# Patient Record
Sex: FEMALE | Race: WHITE
[De-identification: names, ages, dates, MRNs, and addresses within clinical notes are randomized per-mention and may not be internally consistent; named-entity substitution may affect disease eponyms.]

---

## 2020-10-09 ENCOUNTER — HOSPITAL ENCOUNTER (EMERGENCY)
Dept: HOSPITAL 97 - ER | Age: 70
Discharge: HOME | End: 2020-10-09
Payer: COMMERCIAL

## 2020-10-09 VITALS — OXYGEN SATURATION: 100 % | SYSTOLIC BLOOD PRESSURE: 138 MMHG | DIASTOLIC BLOOD PRESSURE: 76 MMHG

## 2020-10-09 VITALS — TEMPERATURE: 97.8 F

## 2020-10-09 DIAGNOSIS — N39.0: ICD-10-CM

## 2020-10-09 DIAGNOSIS — E86.0: ICD-10-CM

## 2020-10-09 DIAGNOSIS — Z88.3: ICD-10-CM

## 2020-10-09 DIAGNOSIS — I10: ICD-10-CM

## 2020-10-09 DIAGNOSIS — Z88.5: ICD-10-CM

## 2020-10-09 DIAGNOSIS — E11.65: Primary | ICD-10-CM

## 2020-10-09 LAB
BUN BLD-MCNC: 46 MG/DL (ref 7–18)
GLUCOSE SERPLBLD-MCNC: 265 MG/DL (ref 74–106)
HCT VFR BLD CALC: 42.7 % (ref 36–45)
LYMPHOCYTES # SPEC AUTO: 1.5 K/UL (ref 0.7–4.9)
PMV BLD: 9.9 FL (ref 7.6–11.3)
POTASSIUM SERPL-SCNC: 3.9 MMOL/L (ref 3.5–5.1)
RBC # BLD: 4.65 M/UL (ref 3.86–4.86)
UA COMPLETE W REFLEX CULTURE PNL UR: (no result)

## 2020-10-09 PROCEDURE — 81015 MICROSCOPIC EXAM OF URINE: CPT

## 2020-10-09 PROCEDURE — 96372 THER/PROPH/DIAG INJ SC/IM: CPT

## 2020-10-09 PROCEDURE — 96361 HYDRATE IV INFUSION ADD-ON: CPT

## 2020-10-09 PROCEDURE — 87077 CULTURE AEROBIC IDENTIFY: CPT

## 2020-10-09 PROCEDURE — 85025 COMPLETE CBC W/AUTO DIFF WBC: CPT

## 2020-10-09 PROCEDURE — 82947 ASSAY GLUCOSE BLOOD QUANT: CPT

## 2020-10-09 PROCEDURE — 87186 SC STD MICRODIL/AGAR DIL: CPT

## 2020-10-09 PROCEDURE — 87086 URINE CULTURE/COLONY COUNT: CPT

## 2020-10-09 PROCEDURE — 99284 EMERGENCY DEPT VISIT MOD MDM: CPT

## 2020-10-09 PROCEDURE — 87088 URINE BACTERIA CULTURE: CPT

## 2020-10-09 PROCEDURE — 80048 BASIC METABOLIC PNL TOTAL CA: CPT

## 2020-10-09 PROCEDURE — 96374 THER/PROPH/DIAG INJ IV PUSH: CPT

## 2020-10-09 PROCEDURE — 36415 COLL VENOUS BLD VENIPUNCTURE: CPT

## 2020-10-09 PROCEDURE — 81003 URINALYSIS AUTO W/O SCOPE: CPT

## 2020-10-09 NOTE — ER
Nurse's Notes                                                                                     

 [facility]                                                                 

Name: Elizabeth Strong                                                                               

Age: 70 yrs                                                                                       

Sex: Female                                                                                       

: 1950                                                                                   

MRN: [id number]                                                                                   

Arrival Date: 10/09/2020                                                                          

Time: 14:09                                                                                       

Account#: [id number]                                                                            

Bed 7                                                                                             

Private MD:                                                                                       

Diagnosis: Hyperglycemia, unspecified;Dehydration;Urinary tract infection, site not specified     

                                                                                                  

Presentation:                                                                                     

10/09                                                                                             

14:18 Chief complaint: Patient states: Blood sugar reads hi today. + nausea. Not taking       ll1 

      medications as prescribed. Coronavirus screen: Client denies travel out of the [place] in      

      the last 14 days. At this time, the client does not indicate any symptoms associated        

      with coronavirus-19. Ebola Screen: Patient denies travel to an Ebola-affected area in       

      the 21 days before illness onset. Initial Sepsis Screen: Does the patient meet any 2        

      criteria? No. Patient's initial sepsis screen is negative. Does the patient have a          

      suspected source of infection? No. Patient's initial sepsis screen is negative. Risk        

      Assessment: Do you want to hurt yourself or someone else? Patient reports no desire to      

      harm self or others. Onset of symptoms was 2020.                                

14:18 Method Of Arrival: Ambulatory                                                           ll1 

14:18 Acuity: ANNE 3                                                                           ll1 

                                                                                                  

Historical:                                                                                       

- Allergies:                                                                                      

14:21 Codeine;                                                                                ll1 

14:21 Erythromycin;                                                                           ll1 

- PMHx:                                                                                           

14:21 Hypertension; Diabetes - IDDM;                                                          ll1 

- PSHx:                                                                                           

14:21 Hysterectomy;                                                                           ll1 

                                                                                                  

- Immunization history:: Flu vaccine is not up to date.                                           

- Social history:: Smoking status: Patient denies any tobacco usage or history of.                

                                                                                                  

                                                                                                  

Screenin:25 Abuse screen: Denies threats or abuse. Nutritional screening: No deficits noted.        em  

      Tuberculosis screening: No symptoms or risk factors identified. Fall Risk None              

      identified.                                                                                 

                                                                                                  

Assessment:                                                                                       

14:25 General: Appears in no apparent distress. obese, well groomed, Behavior is calm,        tw2 

      cooperative, appropriate for age. Pain: Denies pain. Neuro: Level of Consciousness is       

      awake, alert, obeys commands, Oriented to person, place, time, situation.                   

      Cardiovascular: Heart tones S1 S2 Patient's skin is warm and dry. Respiratory: Airway       

      is patent Respiratory effort is even, unlabored, Respiratory pattern is regular,            

      symmetrical, Breath sounds are clear bilaterally. GI: Abdomen is round non-distended,       

      obese, Bowel sounds present X 4 quads. Reports nausea. : No signs and/or symptoms         

      were reported regarding the genitourinary system. EENT: No signs and/or symptoms were       

      reported regarding the EENT system. Derm: No signs and/or symptoms reported regarding       

      the dermatologic system. Musculoskeletal: Range of motion: intact in all extremities.       

15:21 Reassessment: Patient appears in no apparent distress at this time. No changes from     tw2 

      previously documented assessment. Patient and/or family updated on plan of care and         

      expected duration. Pain level reassessed. Patient is alert, oriented x 3, equal             

      unlabored respirations, skin warm/dry/pink.                                                 

16:15 Reassessment: Patient appears in no apparent distress at this time. No changes from     tw2 

      previously documented assessment. Patient and/or family updated on plan of care and         

      expected duration. Pain level reassessed. Patient is alert, oriented x 3, equal             

      unlabored respirations, skin warm/dry/pink.                                                 

17:04 Reassessment: Patient appears in no apparent distress at this time. No changes from     tw2 

      previously documented assessment. Patient and/or family updated on plan of care and         

      expected duration. Pain level reassessed. Patient is alert, oriented x 3, equal             

      unlabored respirations, skin warm/dry/pink.                                                 

                                                                                                  

Vital Signs:                                                                                      

14:18  / 106; Pulse 79; Resp 18; Temp 97.8; Pulse Ox 100% ; Weight 113.4 kg; Height 5   ll1 

      ft. (152.40 cm); Pain 0/10;                                                                 

15:20  / 81; Pulse 80; Resp 17; Pulse Ox 100% on R/A;                                   tw2 

16:15  / 76; Pulse 69; Resp 17; Pulse Ox 99% on R/A;                                    tw2 

17:04  / 76; Pulse 67; Resp 17; Pulse Ox 100% on R/A;                                   tw2 

14:18 Body Mass Index 48.82 (113.40 kg, 152.40 cm)                                            ll1 

                                                                                                  

ED Course:                                                                                        

14:09 Patient arrived in ED.                                                                  ds1 

14:20 Triage completed.                                                                       ll1 

14:22 Arm band placed on Patient placed in an exam room, on a stretcher.                      ll1 

14:23 Omer Kaplan, RN is Primary Nurse.                                                      em  

14:25 Patient has correct armband on for positive identification. Bed in low position. Call   em  

      light in reach. Pulse ox on. NIBP on.                                                       

14:39 Chris Mckoy PA is PHCP.                                                              [facility] 

14:39 Dread Rodriguez MD is Attending Physician.                                             [facility] 

15:15 Inserted saline lock: 20 gauge in left antecubital area, using aseptic technique. Blood tw2 

      collected.                                                                                  

17:03 No provider procedures requiring assistance completed. IV discontinued, intact,         tw2 

      bleeding controlled, No redness/swelling at site. Pressure dressing applied.                

                                                                                                  

Administered Medications:                                                                         

15:20 Drug: NS 0.9% 500 ml Route: IV; Rate: bolus; Site: left antecubital;                    tw2 

16:14 Follow up: Response: No adverse reaction; IV Status: Completed infusion; IV Intake:     tw2 

      500ml                                                                                       

15:39 Drug: Insulin Regular Human 5 units {Co-Signature: jaems (Omer Kaplan RN).} Route: IVP;    tw2 

      Site: left antecubital;                                                                     

16:14 Follow up: Response: No adverse reaction; Blood sugar is lowered                        tw2 

15:39 Drug: Insulin Regular Human 5 units {Co-Signature: james (Omer Kaplan RN).} Route: Sub-Q;  tw2 

      Site: left upper arm;                                                                       

16:14 Follow up: Response: No adverse reaction; Blood sugar is lowered                        tw2 

                                                                                                  

                                                                                                  

Point of Care Testing:                                                                            

      Blood Glucose:                                                                              

14:27 Blood Glucose: 323 mg/dL;                                                               tw2 

16:14 Blood Glucose: 215 mg/dL;                                                               tw2 

14:27 by Cristhian Garrison                                                                          tw2 

      Ranges:                                                                                     

                                                                                                  

Intake:                                                                                           

16:14 IV: 500ml; Total: 500ml.                                                                tw2 

                                                                                                  

Outcome:                                                                                          

16:48 Discharge ordered by MD.                                                                [facility] 

17:03 Discharged to home ambulatory.                                                          tw2 

17:03 Condition: stable                                                                           

17:03 Discharge instructions given to patient, Instructed on discharge instructions, follow       

      up and referral plans. medication usage, Demonstrated understanding of instructions,        

      follow-up care, medications, Prescriptions given X 1.                                       

17:04 Patient left the ED.                                                                    tw2 

                                                                                                  

Addendum:                                                                                         

10/12/2020                                                                                        

     11:56 Addendum: Culture Results: Positive urine culture. Prescription called-in to pharmacy   a
a5

           of choice. to [facility] pharmacy in [place], [place], called-in Levaquin 500mg Daily x 5 

           days per WILMA Shin, spoke to pt's son over the phone and instructed to stop    

           Cephalexin.                                                                            

                                                                                                  

Signatures:                                                                                       

Chris Mckoy PA PA jmm Munoz, Edgar, RN                        RN   em                                                   

Downey, Zara                                ds1                                                  

Zenia Mcnamara, RN                     RN   aa5                                                  

Mala Byers RN                          RN   tw2                                                  

Rishi House RN                       RN   ll1                                                  

Omer Kaplan RN                               em                                                   

                                                                                                  

**************************************************************************************************

## 2020-10-09 NOTE — EDPHYS
Physician Documentation                                                                           

 Formerly [facility]                                                                 

Name: Elizabeth Strong                                                                               

Age: 70 yrs                                                                                       

Sex: Female                                                                                       

: 1950                                                                                   

MRN: [id number]                                                                                   

Arrival Date: 10/09/2020                                                                          

Time: 14:09                                                                                       

Account#: [id number]                                                                            

Bed 7                                                                                             

Private MD:                                                                                       

ED Physician Dread Rodriguez                                                                      

HPI:                                                                                              

10/09                                                                                             

14:41 This 70 yrs old  Female presents to ER via Ambulatory with complaints of High  jmm 

      Blood Pressure.                                                                             

14:41 This is a 70 year old female with a history of HTN, DM, that presents to the ED due to  m 

      concerns from home nursing. Patient BGL was elevated along with blood pressure. Patient     

      complains of fatigue but denies chest pain, headache, shortness of breath, abdominal        

      pain or dysuria. . Onset: The symptoms/episode began/occurred today.                        

                                                                                                  

Historical:                                                                                       

- Allergies:                                                                                      

14:21 Codeine;                                                                                ll1 

14:21 Erythromycin;                                                                           ll1 

- PMHx:                                                                                           

14:21 Hypertension; Diabetes - IDDM;                                                          ll1 

- PSHx:                                                                                           

14:21 Hysterectomy;                                                                           ll1 

                                                                                                  

- Immunization history:: Flu vaccine is not up to date.                                           

- Social history:: Smoking status: Patient denies any tobacco usage or history of.                

                                                                                                  

                                                                                                  

ROS:                                                                                              

14:41 Cardiovascular: Negative for chest pain, palpitations, and edema, Respiratory: Negative jmm 

      for shortness of breath, cough, wheezing, and pleuritic chest pain, Abdomen/GI:             

      Negative for abdominal pain, nausea, vomiting, diarrhea, and constipation.                  

14:41 Constitutional: Positive for fatigue, malaise.                                              

14:41 Neuro: Positive for weakness.                                                               

14:41 All other systems are negative.                                                             

                                                                                                  

Exam:                                                                                             

14:41 Constitutional:  This is a well developed, well nourished patient who is awake, alert,  jmm 

      and in no acute distress. Head/Face:  atraumatic. Eyes:  EOMI, no conjunctival erythema     

      appreciated ENT:  Moist Mucus Membranes Neck:  Trachea midline, Supple Chest/axilla:        

      Normal chest wall appearance and motion.   Cardiovascular:  Regular rate and rhythm.        

      No edema appreciated Respiratory:  Normal respirations, no respiratory distress             

      appreciated Abdomen/GI:  Non distended, soft Back:  Normal ROM Skin:  General               

      appearance color normal MS/ Extremity:  Moves all extremities, no obvious deformities       

      appreciated, no edema noted to the lower extremities  Neuro:  Awake and alert, normal       

      gait Psych:  Behavior is normal, Mood is normal, Patient is cooperative and pleasant        

                                                                                                  

Vital Signs:                                                                                      

14:18  / 106; Pulse 79; Resp 18; Temp 97.8; Pulse Ox 100% ; Weight 113.4 kg; Height 5   ll1 

      ft. (152.40 cm); Pain 0/10;                                                                 

15:20  / 81; Pulse 80; Resp 17; Pulse Ox 100% on R/A;                                   tw2 

16:15  / 76; Pulse 69; Resp 17; Pulse Ox 99% on R/A;                                    tw2 

17:04  / 76; Pulse 67; Resp 17; Pulse Ox 100% on R/A;                                   tw2 

14:18 Body Mass Index 48.82 (113.40 kg, 152.40 cm)                                            ll1 

                                                                                                  

MDM:                                                                                              

14:41 Patient medically screened.                                                             claudio 

16:46 Data reviewed: vital signs, nurses notes. Counseling: I had a detailed discussion with  maribell 

      the patient and/or guardian regarding: the historical points, exam findings, and any        

      diagnostic results supporting the discharge/admit diagnosis, lab results, the need for      

      outpatient follow up, to return to the emergency department if symptoms worsen or           

      persist or if there are any questions or concerns that arise at home. ED course:            

      Patient is alert and non toxic in appearance in the ED. Patient is advised to follow up     

      with pcp and otherwise given strict return precautions. Patient understood and agrees       

      with the plan of care. .                                                                    

                                                                                                  

10/09                                                                                             

14:39 Order name: Glucose, Ancillary Testing; Complete Time: 14:41                            [facility]

10/09                                                                                             

14:55 Order name: CBC with Diff; Complete Time: 15:44                                         [facility] 

10/09                                                                                             

14:55 Order name: BMP; Complete Time: 16:01                                                   [facility] 

10/09                                                                                             

15:24 Order name: Urine Microscopic Only; Complete Time: 16:51                                [facility] 

10/09                                                                                             

15:31 Order name: Urine Dipstick--Ancillary (enter results); Complete Time: 16:23               

10/09                                                                                             

16:25 Order name: Glucose, Ancillary Testing; Complete Time: 16:26                            [facility]

10/09                                                                                             

14:55 Order name: Urine Dipstick-Ancillary (obtain specimen); Complete Time: 15:24            [facility] 

10/09                                                                                             

15:20 Order name: IV Start; Complete Time: 15:20                                              tw2 

10/09                                                                                             

16:52 Order name: Urine Culture                                                               [facility]

                                                                                                  

Administered Medications:                                                                         

15:20 Drug: NS 0.9% 500 ml Route: IV; Rate: bolus; Site: left antecubital;                    tw2 

16:14 Follow up: Response: No adverse reaction; IV Status: Completed infusion; IV Intake:     tw2 

      500ml                                                                                       

15:39 Drug: Insulin Regular Human 5 units {Co-Signature: james (Omer Kaplan RN).} Route: IVP;    tw2 

      Site: left antecubital;                                                                     

16:14 Follow up: Response: No adverse reaction; Blood sugar is lowered                        tw2 

15:39 Drug: Insulin Regular Human 5 units {Co-Signature: james (Omer Kaplan RN).} Route: Sub-Q;  tw2 

      Site: left upper arm;                                                                       

16:14 Follow up: Response: No adverse reaction; Blood sugar is lowered                        tw2 

                                                                                                  

                                                                                                  

Point of Care Testing:                                                                            

      Blood Glucose:                                                                              

14:27 Blood Glucose: 323 mg/dL;                                                               tw2 

16:14 Blood Glucose: 215 mg/dL;                                                               tw2 

14:27 by XplentyCristhian                                                                          tw2 

      Ranges:                                                                                     

      Critical Glucose Levels:Adult <50 mg/dl or >400 mg/dl  <40 mg/dl or >180 mg/dl       

Disposition:                                                                                      

10/09/20 16:48 Discharged to Home. Impression: Hyperglycemia, unspecified, Dehydration,           

  Urinary tract infection, site not specified.                                                    

- Condition is Stable.                                                                            

- Discharge Instructions: Dehydration, Elderly, Hyperglycemia, Urinary Tract Infection,           

  Adult.                                                                                          

- Prescriptions for Cephalexin 500 mg Oral Capsule - take 1 capsule by ORAL route every           

  8 hours for 10 days; 30 capsule.                                                                

- Medication Reconciliation Form, Thank You Letter, Antibiotic Education, Prescription            

  Opioid Use form.                                                                                

- Follow up: Private Physician; When: 2 - 3 days; Reason: Recheck today's complaints,             

  Continuance of care, Re-evaluation by your physician.                                           

                                                                                                  

                                                                                                  

                                                                                                  

Addendum:                                                                                         

10/10/2020                                                                                        

     17:56 Co-signature as Attending Physician, Dread Rodriguez MD I agree with the assessment and  c
ha

           plan of care.                                                                          

                                                                                                  

Signatures:                                                                                       

Dispatcher [employer]                           Dread Bowen MD MD cha Mickail, Joel, PA PA jmm Wise, Tara, RN                          RN   tw2                                                  

Rishi House RN                       RN   1                                                  

Omer Kaplan RN                               em                                                   

                                                                                                  

Corrections: (The following items were deleted from the chart)                                    

10/09                                                                                             

17:04 16:48 10/09/2020 16:48 Discharged to Home. Impression: Hyperglycemia, unspecified;      tw2 

      Dehydration; Urinary tract infection, site not specified. Condition is Stable. Forms        

      are Medication Reconciliation Form, Thank You Letter, Antibiotic Education,                 

      Prescription Opioid Use. Follow up: Private Physician; When: 2 - 3 days; Reason:            

      Recheck today's complaints, Continuance of care, Re-evaluation by your physician. maribell       

                                                                                                  

**************************************************************************************************

## 2021-04-11 ENCOUNTER — HOSPITAL ENCOUNTER (EMERGENCY)
Dept: HOSPITAL 97 - ER | Age: 71
Discharge: HOME | End: 2021-04-11
Payer: COMMERCIAL

## 2021-04-11 VITALS — SYSTOLIC BLOOD PRESSURE: 141 MMHG | DIASTOLIC BLOOD PRESSURE: 96 MMHG

## 2021-04-11 VITALS — TEMPERATURE: 98 F

## 2021-04-11 VITALS — OXYGEN SATURATION: 96 %

## 2021-04-11 DIAGNOSIS — F03.90: ICD-10-CM

## 2021-04-11 DIAGNOSIS — Z88.5: ICD-10-CM

## 2021-04-11 DIAGNOSIS — Z88.3: ICD-10-CM

## 2021-04-11 DIAGNOSIS — E11.649: Primary | ICD-10-CM

## 2021-04-11 DIAGNOSIS — I10: ICD-10-CM

## 2021-04-11 LAB
ALBUMIN SERPL BCP-MCNC: 2.6 G/DL (ref 3.4–5)
ALP SERPL-CCNC: 93 U/L (ref 45–117)
ALT SERPL W P-5'-P-CCNC: 13 U/L (ref 12–78)
AST SERPL W P-5'-P-CCNC: 13 U/L (ref 15–37)
BLD SMEAR INTERP: (no result)
BUN BLD-MCNC: 29 MG/DL (ref 7–18)
GLUCOSE SERPLBLD-MCNC: 492 MG/DL (ref 74–106)
HCT VFR BLD CALC: 41.3 % (ref 36–45)
INR BLD: 0.95
LIPASE SERPL-CCNC: 83 U/L (ref 73–393)
LYMPHOCYTES # SPEC AUTO: 0.4 K/UL (ref 0.7–4.9)
MAGNESIUM SERPL-MCNC: 2.1 MG/DL (ref 1.8–2.4)
MORPHOLOGY BLD-IMP: (no result)
NT-PROBNP SERPL-MCNC: 2842 PG/ML (ref ?–125)
PMV BLD: 8.9 FL (ref 7.6–11.3)
POTASSIUM SERPL-SCNC: 4.5 MMOL/L (ref 3.5–5.1)
RBC # BLD: 4.47 M/UL (ref 3.86–4.86)
TROPONIN (EMERG DEPT USE ONLY): < 0.02 NG/ML (ref 0–0.04)

## 2021-04-11 PROCEDURE — 85610 PROTHROMBIN TIME: CPT

## 2021-04-11 PROCEDURE — 81003 URINALYSIS AUTO W/O SCOPE: CPT

## 2021-04-11 PROCEDURE — 70450 CT HEAD/BRAIN W/O DYE: CPT

## 2021-04-11 PROCEDURE — 82947 ASSAY GLUCOSE BLOOD QUANT: CPT

## 2021-04-11 PROCEDURE — 83690 ASSAY OF LIPASE: CPT

## 2021-04-11 PROCEDURE — 80048 BASIC METABOLIC PNL TOTAL CA: CPT

## 2021-04-11 PROCEDURE — 85025 COMPLETE CBC W/AUTO DIFF WBC: CPT

## 2021-04-11 PROCEDURE — 36415 COLL VENOUS BLD VENIPUNCTURE: CPT

## 2021-04-11 PROCEDURE — 84484 ASSAY OF TROPONIN QUANT: CPT

## 2021-04-11 PROCEDURE — 80076 HEPATIC FUNCTION PANEL: CPT

## 2021-04-11 PROCEDURE — 81015 MICROSCOPIC EXAM OF URINE: CPT

## 2021-04-11 PROCEDURE — 83735 ASSAY OF MAGNESIUM: CPT

## 2021-04-11 PROCEDURE — 99284 EMERGENCY DEPT VISIT MOD MDM: CPT

## 2021-04-11 PROCEDURE — 83880 ASSAY OF NATRIURETIC PEPTIDE: CPT

## 2021-04-11 PROCEDURE — 93005 ELECTROCARDIOGRAM TRACING: CPT

## 2021-04-11 NOTE — EDPHYS
Physician Documentation                                                                           

 [facility]                                                                 

Name: Elizabeth Strong                                                                               

Age: 71 yrs                                                                                       

Sex: Female                                                                                       

: 1950                                                                                   

MRN: [id number]                                                                                   

Arrival Date: 2021                                                                          

Time: 13:10                                                                                       

Account#: [id number]                                                                            

Bed 8                                                                                             

Private MD:                                                                                       

ED Physician Sudeep York                                                                         

HPI:                                                                                              

                                                                                             

13:25 This 71 yrs old  Female presents to ER via EMS with complaints of High Blood   cp  

      Sugar.                                                                                      

13:25 The patient or guardian reports hyperglycemia that was potentially precipitated by      cp  

      change of medications.                                                                      

13:25 Onset: The symptoms/episode began/occurred over past several days.                      cp  

13:25 Associated signs and symptoms: Pertinent negatives: diarrhea, vomiting, altered mental  cp  

      status, chest pain, abdominal pain. Current symptoms: In the emergency department the       

      patient's symptoms are unchanged from the initial presentation.                             

15:30 Daughter reports patient was recently taken off insulin and placed on oral medication   cp  

      for diabetes.                                                                               

                                                                                                  

Historical:                                                                                       

- Allergies:                                                                                      

13:15 Codeine;                                                                                hb  

13:15 Erythromycin;                                                                           hb  

- PMHx:                                                                                           

13:15 Diabetes - IDDM; Hypertension; Dementia;                                                hb  

- PSHx:                                                                                           

13:15 Hysterectomy;                                                                           hb  

                                                                                                  

- Immunization history:: Adult Immunizations up to date.                                          

- Social history:: Smoking status: Patient denies any tobacco usage or history of.                

                                                                                                  

                                                                                                  

ROS:                                                                                              

13:30 Eyes: Negative for injury, pain, redness, and discharge.                                cp  

13:30 Constitutional: Negative for fever.                                                         

13:30 Cardiovascular: Negative for chest pain.                                                    

13:30 Respiratory: Negative for cough, wheezing.                                                  

13:30 Abdomen/GI: Negative for abdominal pain, vomiting, diarrhea, constipation.                  

13:30 Neuro: Negative for altered mental status, headache, syncope.                               

13:30 All other systems are negative.                                                             

                                                                                                  

Exam:                                                                                             

13:35 Constitutional: The patient appears in no acute distress, alert, awake,                 cp  

      non-diaphoretic, non-toxic, well developed, well nourished.                                 

13:35 Head/Face:  Normocephalic, atraumatic.                                                  cp  

13:35 Eyes: Periorbital structures: appear normal, Pupils: equal, round, and reactive to          

      light and accomodation, Extraocular movements: intact throughout, Conjunctiva: normal,      

      no exudate, no injection, Sclera: no appreciated abnormality, Lids and lashes: appear       

      normal, bilaterally.                                                                        

13:35 ENT: External ear(s): are unremarkable, Nose: is normal, Mouth: Lips: dry, Oral mucosa:     

      moist, Posterior pharynx: Airway: no evidence of obstruction, patent.                       

13:35 Neck: ROM/movement: is normal, is supple, without pain, no range of motions                 

      limitations, no meningismus.                                                                

13:35 Chest/axilla: Inspection: normal, Palpation: is normal, no crepitus, no tenderness.         

13:35 Cardiovascular: Rate: normal, Rhythm: regular, Edema: ankle edema, that is very mild,       

      JVD: is not appreciated.                                                                    

13:35 Respiratory: the patient does not display signs of respiratory distress,  Respirations:     

      normal, no use of accessory muscles, no retractions, labored breathing, is not present,     

      Breath sounds: are clear throughout, no decreased breath sounds, no stridor, no             

      wheezing.                                                                                   

13:35 Abdomen/GI: Inspection: abdomen appears normal, Palpation: abdomen is soft and              

      non-tender, in all quadrants.                                                               

13:35 Skin: cellulitis, is not appreciated, no rash present.                                      

13:35 Neuro: Orientation: no acute changes, per EMS, to person, Mentation: no acute changes,      

      per EMS, able to follow commands.                                                           

14:20 ECG was reviewed by the Attending Physician.                                              

                                                                                                  

Vital Signs:                                                                                      

13:13  / 84; Pulse 70; Resp 16; Temp 98; Pulse Ox 97% on R/A;                           hb  

13:59  / 109; Pulse 88; Resp 15; Pulse Ox 98% on R/A;                                   hb  

15:00  / 91; Pulse 82; Resp 17; Pulse Ox 96% on R/A;                                    hb  

15:45  / 96; Pulse 71; Resp 17; Pulse Ox 96% on R/A;                                    hb  

                                                                                                  

MDM:                                                                                              

13:23 Patient medically screened.                                                             cp  

14:00 Differential diagnosis: DKA, UTI, acute MI, pancreatitis.                                 

15:50 Data reviewed: vital signs, nurses notes, lab test result(s), EKG, radiologic studies,  cp  

      CT scan, I have discussed the patient's presentation/case with the attending Emergency      

      Department Physician; and as a result, I will discharge patient.                            

15:50 Test interpretation: by ED physician or midlevel provider: ECG.                           

15:50 Counseling: I had a detailed discussion with the patient and/or guardian regarding: the cp  

      historical points, exam findings, and any diagnostic results supporting the                 

      discharge/admit diagnosis, lab results, radiology results, the need for outpatient          

      follow up, for definitive care, an internist, to return to the emergency department if      

      symptoms worsen or persist or if there are any questions or concerns that arise at          

      home. Response to treatment: the patient's symptoms have markedly improved after            

      treatment, and as a result, I will discharge patient.                                       

                                                                                                  

                                                                                             

13:16 Order name: Basic Metabolic Panel                                                         

                                                                                             

13:16 Order name: CBC with Diff; Complete Time: 14:58                                         cp  

                                                                                             

14:21 Interpretation: Normal except: WBC 13.00; ALFONZO% 91.9; LYM% 3.3; NEUT A 11.9; LYMA 0.4.     

                                                                                             

13:16 Order name: LFT's; Complete Time: 14:15                                                   

                                                                                             

14:15 Interpretation: Normal except: AST 13; ALB 2.6; GLOB 4.2; A/G 0.6.                        

                                                                                             

13:16 Order name: Magnesium; Complete Time: 14:15                                               

                                                                                             

14:59 Interpretation: MG 2.1; Reviewed.                                                       cp  

                                                                                             

13:16 Order name: NT PRO-BNP; Complete Time: 14:15                                              

                                                                                             

13:16 Order name: PT-INR; Complete Time: 13:58                                                  

                                                                                             

13:16 Order name: Troponin (emerg Dept Use Only); Complete Time: 14:15                          

                                                                                             

14:24 Interpretation: Abnormal: TROPED < 0.02.                                                  

                                                                                             

13:16 Order name: Urine Microscopic Only; Complete Time: 13:58                                  

                                                                                             

13:58 Interpretation: Normal except: URBC 5-10; AMORPH 3+.                                    cp  

                                                                                             

13:17 Order name: Basic Metabolic Panel; Complete Time: 14:15                                 EDMS

                                                                                             

14:21 Interpretation: Normal except: ; GLUC 492; BUN 29; CRE 2.22; GFR 22.              cp  

                                                                                             

13:18 Order name: Lipase; Complete Time: 14:15                                                cp  

                                                                                             

13:29 Order name: Urine Dipstick-Ancillary; Complete Time: 13:57                              EDMS

                                                                                             

13:57 Interpretation: Normal except: UGLUC 2+; UBLD 1+; UPROT 3+.                             cp  

                                                                                             

13:54 Order name: CBC Smear Scan; Complete Time: 14:58                                        EDMS

                                                                                             

14:23 Order name: CT Head Brain wo Cont; Complete Time: 15:05                                   

                                                                                             

15:06 Interpretation: Report reviewed.                                                          

                                                                                             

15:52 Order name: Glucose, Ancillary Testing                                                  EDMS

                                                                                             

13:16 Order name: EKG; Complete Time: 13:17                                                     

                                                                                             

13:16 Order name: Cardiac monitoring; Complete Time: 13:21                                      

                                                                                             

13:16 Order name: EKG - Nurse/Tech; Complete Time: 14:02                                        

                                                                                             

13:16 Order name: IV Saline Lock; Complete Time: 13:31                                          

                                                                                             

13:16 Order name: Labs collected and sent; Complete Time: 13:31                                 

                                                                                             

13:16 Order name: O2 Per Protocol; Complete Time: 13:21                                         

                                                                                             

13:16 Order name: O2 Sat Monitoring; Complete Time: 13:21                                       

                                                                                             

13:16 Order name: Cath; Complete Time: 13:31                                                    

                                                                                             

13:16 Order name: Urine Dipstick-Ancillary (obtain specimen); Complete Time: 13:31              

                                                                                             

13:16 Order name: Accucheck Blood Glucose; Complete Time: 14:01                                 

                                                                                             

15:06 Order name: Accucheck Blood Glucose; Complete Time: 15:46                               cp  

                                                                                                  

EC:20 Rate is 79 beats/min. Rhythm is regular. QRS interval is normal. QT interval is normal. cp  

      T waves are Inverted in leads aVL, aVR. Interpreted by me. Reviewed by me.                  

                                                                                                  

Administered Medications:                                                                         

13:31 Drug: NS 0.9% 250 ml Route: IV; Rate: bolus; Site: right antecubital;                   hb  

14:20 Follow up: IV Status: Completed infusion; IV Intake: 500ml                              hb  

15:47 Follow up: Response: No adverse reaction                                                hb  

14:25 Drug: Insulin Regular Human 10 units {Co-Signature: ss (Beatrice Oseguera RN).} Route: IVP; hb  

      Site: left antecubital;                                                                     

15:46 Follow up: Response: No adverse reaction                                                hb  

14:30 Drug: NS 0.9% 250 ml Route: IV; Rate: bolus; Site: left antecubital;                    hb  

15:10 Follow up: IV Status: Completed infusion; IV Intake: 500ml                              hb  

15:15 Follow up: Response: No adverse reaction                                                hb  

                                                                                                  

                                                                                                  

Disposition:                                                                                      

17:31 Co-signature as Attending Physician, Sudeep York MD.                                    rn  

                                                                                                  

Disposition:                                                                                      

21 15:51 Discharged to Home. Impression: Diabetes mellitus due to underlying condition      

  with hyperglycemia.                                                                             

- Condition is Stable.                                                                            

- Discharge Instructions: Blood Glucose Monitoring, Adult, Diabetes Mellitus and Food.            

                                                                                                  

- Medication Reconciliation Form, Thank You Letter, Antibiotic Education, Prescription            

  Opioid Use form.                                                                                

- Follow up: Private Physician; When: 1 - 2 days; Reason: Recheck today's complaints.             

                                                                                                  

                                                                                                  

                                                                                                  

Signatures:                                                                                       

Dispatcher [employer]                           EDMS                                                 

Sudeep York MD MD   rn                                                   

Dread Vargas PA PA cp Baxter, Heather, RN                     RN   galen Oseguera RN                             ss                                                   

                                                                                                  

Corrections: (The following items were deleted from the chart)                                    

14:21 13:57 Normal except: WBC 13.00; ALFONZO% 91.9; LYM% 3.3; NEUT A 11.9. cp                    cp  

16:41 15:51 2021 15:51 Discharged to Home. Impression: Diabetes mellitus due to         hb  

      underlying condition with hyperglycemia. Condition is Stable. Forms are Medication          

      Reconciliation Form, Thank You Letter, Antibiotic Education, Prescription Opioid Use.       

      Follow up: Private Physician; When: 1 - 2 days; Reason: Recheck today's complaints. cp      

18:57 17:30 Constitutional: Negative for fever, cp                                            cp  

18:57 17:30 Cardiovascular: Negative for chest pain, cp                                       cp  

18:57 17:30 Abdomen/GI: Negative for abdominal pain, vomiting, diarrhea, constipation, cp     cp  

18:57 17:30 Skin: Negative for rash, cp                                                       cp  

18:57 17:30 Neuro: Negative for altered mental status, headache, cp                           cp  

18:57 17:30 All other systems are negative, cp                                                cp  

                                                                                             

14:46  15:20 Daughter reports patient was recently taken off insulin and placed on oral  cp  

      medication for diabetes. cp                                                                 

                                                                                                  

**************************************************************************************************

## 2021-04-11 NOTE — RAD REPORT
EXAM DESCRIPTION:  CT - Head Brain Wo Cont - 4/11/2021 2:45 pm

 

CLINICAL HISTORY:  hypertension

Headache, drowsiness, hypertension

 

COMPARISON:  No comparisons

 

TECHNIQUE:  All CT scans are performed using dose optimization technique as appropriate and may inclu
de automated exposure control or mA/KV adjustment according to patient size.

 

FINDINGS:  No intracranial hemorrhage, hydrocephalus or extra-axial fluid collection.Mild generalized
 brain atrophy is present with mild periventricular and deep white matter chronic microvascular ische
chana changes.No areas of brain edema or evidence of midline shift.

 

The paranasal sinuses and mastoids are clear. The calvarium is intact. Mild vertebral atherosclerosis
.

 

IMPRESSION:  No acute intracranial abnormality.

## 2021-04-11 NOTE — ER
Nurse's Notes                                                                                     

 [facility]                                                                 

Name: Elizabeth Strong                                                                               

Age: 71 yrs                                                                                       

Sex: Female                                                                                       

: 1950                                                                                   

MRN: [id number]                                                                                   

Arrival Date: 2021                                                                          

Time: 13:10                                                                                       

Account#: [id number]                                                                            

Bed 8                                                                                             

Private MD:                                                                                       

Diagnosis: Diabetes mellitus due to underlying condition with hyperglycemia                       

                                                                                                  

Presentation:                                                                                     

                                                                                             

13:13 Chief complaint: EMS states: Home -500s for last several days, family reports    hb  

      recent insulin change. Coronavirus screen: At this time, the client does not indicate       

      any symptoms associated with coronavirus-19. Ebola Screen: No symptoms or risks             

      identified at this time. Initial Sepsis Screen: Does the patient meet any 2 criteria?       

      No. Patient's initial sepsis screen is negative. Does the patient have a suspected          

      source of infection? No. Patient's initial sepsis screen is negative. Risk Assessment:      

      Do you want to hurt yourself or someone else? Patient reports no desire to harm self or     

      others. Onset of symptoms was 2021.                                               

13:13 Method Of Arrival: EMS: [facility]                                                       hb  

13:13 Acuity: ANNE 3                                                                           hb  

13:15 Care prior to arrival: Glucose check: 460.                                              hb  

                                                                                                  

Historical:                                                                                       

- Allergies:                                                                                      

13:15 Codeine;                                                                                hb  

13:15 Erythromycin;                                                                           hb  

- PMHx:                                                                                           

13:15 Diabetes - IDDM; Hypertension; Dementia;                                                hb  

- PSHx:                                                                                           

13:15 Hysterectomy;                                                                           hb  

                                                                                                  

- Immunization history:: Adult Immunizations up to date.                                          

- Social history:: Smoking status: Patient denies any tobacco usage or history of.                

                                                                                                  

                                                                                                  

Screenin:15 Abuse screen: Denies threats or abuse. Denies injuries from another. Nutritional        hb  

      screening: No deficits noted. Tuberculosis screening: No symptoms or risk factors           

      identified. Fall Risk None identified.                                                      

                                                                                                  

Assessment:                                                                                       

13:15 General: Appears in no apparent distress. Behavior is calm, cooperative. Pain: Denies   hb  

      pain. Neuro: Level of Consciousness is awake, alert, obeys commands, Oriented to            

      person, place, situation. Cardiovascular: Patient's skin is warm and dry. Rhythm is         

      regular. Respiratory: Respiratory effort is even, unlabored, Respiratory pattern is         

      regular, symmetrical. GI: No signs and/or symptoms were reported involving the              

      gastrointestinal system. : No signs and/or symptoms were reported regarding the           

      genitourinary system. EENT: No signs and/or symptoms were reported regarding the EENT       

      system. Derm: Skin is pink, warm \T\ dry. Musculoskeletal: No signs and/or symptoms         

      reported regarding the musculoskeletal system.                                              

14:00 Reassessment: Patient appears in no apparent distress at this time. No changes from     hb  

      previously documented assessment. Patient and/or family updated on plan of care and         

      expected duration. Pain level reassessed.                                                   

14:36 Reassessment: cleaned patient of urinary incontinence. Linens change. Call light within ss  

      reach.                                                                                      

15:46 Reassessment: Patient appears in no apparent distress at this time. No changes from     hb  

      previously documented assessment. Patient and/or family updated on plan of care and         

      expected duration. Pain level reassessed.                                                   

16:30 Reassessment: Patient appears in no apparent distress at this time. No changes from     hb  

      previously documented assessment. Patient and/or family updated on plan of care and         

      expected duration. Pain level reassessed.                                                   

                                                                                                  

Vital Signs:                                                                                      

13:13  / 84; Pulse 70; Resp 16; Temp 98; Pulse Ox 97% on R/A;                           hb  

13:59  / 109; Pulse 88; Resp 15; Pulse Ox 98% on R/A;                                   hb  

15:00  / 91; Pulse 82; Resp 17; Pulse Ox 96% on R/A;                                    hb  

15:45  / 96; Pulse 71; Resp 17; Pulse Ox 96% on R/A;                                    hb  

                                                                                                  

ED Course:                                                                                        

13:10 Patient arrived in ED.                                                                  ds1 

13:13 Анна Danielle, RN is Primary Nurse.                                                   hb  

13:14 Triage completed.                                                                       hb  

13:15 Dread Vargas PA is PHCP.                                                                cp  

13:15 Sudeep York MD is Attending Physician.                                                cp  

13:15 Arm band placed on.                                                                     hb  

13:15 Patient has correct armband on for positive identification. Bed in low position. Call   hb  

      light in reach.                                                                             

13:50 Missed attempt(s): 22 gauge in right antecubital area. Bleeding controlled, band aid    hb  

      applied, catheter tip intact.                                                               

13:53 Inserted saline lock: 22 gauge in left antecubital area, using aseptic technique. Blood hb  

      collected.                                                                                  

14:02 Basic Metabolic Panel Sent.                                                             hb  

14:45 CT Head Brain wo Cont In Process Unspecified.                                           EDMS

16:41 No provider procedures requiring assistance completed. IV discontinued, intact,         hb  

      bleeding controlled, No redness/swelling at site.                                           

                                                                                                  

Administered Medications:                                                                         

13:31 Drug: NS 0.9% 250 ml Route: IV; Rate: bolus; Site: right antecubital;                   hb  

14:20 Follow up: IV Status: Completed infusion; IV Intake: 500ml                              hb  

15:47 Follow up: Response: No adverse reaction                                                hb  

14:25 Drug: Insulin Regular Human 10 units {Co-Signature: ss (Beatrice Oseguera RN).} Route: IVP; hb  

      Site: left antecubital;                                                                     

15:46 Follow up: Response: No adverse reaction                                                hb  

14:30 Drug: NS 0.9% 250 ml Route: IV; Rate: bolus; Site: left antecubital;                    hb  

15:10 Follow up: IV Status: Completed infusion; IV Intake: 500ml                              hb  

15:15 Follow up: Response: No adverse reaction                                                hb  

                                                                                                  

                                                                                                  

Intake:                                                                                           

14:20 IV: 500ml; Total: 500ml.                                                                hb  

15:10 IV: 500ml; Total: 1000ml.                                                               hb  

                                                                                                  

Outcome:                                                                                          

15:51 Discharge ordered by MD.                                                                cp  

16:41 Discharged to home via wheelchair, with family.                                         hb  

16:41 Condition: stable                                                                           

16:41 Discharge instructions given to patient, family, Instructed on discharge instructions,      

      follow up and referral plans. medication usage, Demonstrated understanding of               

      instructions, follow-up care, medications.                                                  

16:41 Patient left the ED.                                                                    hb  

                                                                                                  

Signatures:                                                                                       

Dispatcher [employer]                           EDMS                                                 

DowneyZara churchill                                ds1                                                  

Beatrice Oseguera RN RN   ss                                                   

Dread Vargas PA PA cp Baxter, Heather, RN RN                                                      

Beatrice martinez                                                   

                                                                                                  

**************************************************************************************************

## 2021-04-12 NOTE — EKG
Test Date:    2021-04-11               Test Time:    14:09:17

Technician:   JOSE                                     

                                                     

MEASUREMENT RESULTS:                                       

Intervals:                                           

Rate:         79                                     

SD:           296                                    

QRSD:         76                                     

QT:           406                                    

QTc:          465                                    

Axis:                                                

P:                                                   

SD:           296                                    

QRS:          -41                                    

T:            70                                     

                                                     

INTERPRETIVE STATEMENTS:                                       

                                                     

Atrial-paced rhythm with prolonged AV conduction

Left axis deviation

Minimal voltage criteria for LVH, may be normal variant

Anterior infarct, age undetermined

Abnormal ECG

No previous ECG available for comparison



Electronically Signed On 04-12-21 07:14:04 CDT by Carlos Enrique Hernandez

## 2021-10-11 ENCOUNTER — HOSPITAL ENCOUNTER (EMERGENCY)
Dept: HOSPITAL 97 - ER | Age: 71
Discharge: HOME | End: 2021-10-11
Payer: COMMERCIAL

## 2021-10-11 VITALS — SYSTOLIC BLOOD PRESSURE: 182 MMHG | DIASTOLIC BLOOD PRESSURE: 98 MMHG

## 2021-10-11 VITALS — OXYGEN SATURATION: 98 %

## 2021-10-11 VITALS — TEMPERATURE: 98.3 F

## 2021-10-11 DIAGNOSIS — Z88.5: ICD-10-CM
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
## 2021-10-11 DIAGNOSIS — I10: ICD-10-CM

## 2021-10-11 DIAGNOSIS — E11.9: ICD-10-CM

## 2021-10-11 DIAGNOSIS — Z88.3: ICD-10-CM

## 2021-10-11 DIAGNOSIS — S32.049A: Primary | ICD-10-CM

## 2021-10-11 DIAGNOSIS — F03.90: ICD-10-CM

## 2021-10-11 LAB
ALBUMIN SERPL BCP-MCNC: 2.9 G/DL (ref 3.4–5)
ALP SERPL-CCNC: 76 U/L (ref 45–117)
ALT SERPL W P-5'-P-CCNC: 20 U/L (ref 12–78)
AST SERPL W P-5'-P-CCNC: 18 U/L (ref 15–37)
BUN BLD-MCNC: 36 MG/DL (ref 7–18)
GLUCOSE SERPLBLD-MCNC: 231 MG/DL (ref 74–106)
HCT VFR BLD CALC: 41.8 % (ref 36–45)
LYMPHOCYTES # SPEC AUTO: 1 K/UL (ref 0.7–4.9)
PMV BLD: 8.1 FL (ref 7.6–11.3)
POTASSIUM SERPL-SCNC: 4.4 MMOL/L (ref 3.5–5.1)
RBC # BLD: 4.56 M/UL (ref 3.86–4.86)

## 2021-10-11 PROCEDURE — 73552 X-RAY EXAM OF FEMUR 2/>: CPT

## 2021-10-11 PROCEDURE — 96374 THER/PROPH/DIAG INJ IV PUSH: CPT

## 2021-10-11 PROCEDURE — 99285 EMERGENCY DEPT VISIT HI MDM: CPT

## 2021-10-11 PROCEDURE — 96375 TX/PRO/DX INJ NEW DRUG ADDON: CPT

## 2021-10-11 PROCEDURE — 36415 COLL VENOUS BLD VENIPUNCTURE: CPT

## 2021-10-11 PROCEDURE — 72125 CT NECK SPINE W/O DYE: CPT

## 2021-10-11 PROCEDURE — 70450 CT HEAD/BRAIN W/O DYE: CPT

## 2021-10-11 PROCEDURE — 72192 CT PELVIS W/O DYE: CPT

## 2021-10-11 PROCEDURE — 80053 COMPREHEN METABOLIC PANEL: CPT

## 2021-10-11 PROCEDURE — 85025 COMPLETE CBC W/AUTO DIFF WBC: CPT

## 2021-10-11 PROCEDURE — 72131 CT LUMBAR SPINE W/O DYE: CPT

## 2021-10-11 NOTE — ER
Nurse's Notes                                                                                     

 [facility]                                                                 

Name: Elizabeth Strong                                                                               

Age: 71 yrs                                                                                       

Sex: Female                                                                                       

: 1950                                                                                   

MRN: [id number]                                                                                   

Arrival Date: 10/11/2021                                                                          

Time: 13:00                                                                                       

Account#: [id number]                                                                            

Bed 8                                                                                             

Private MD:                                                                                       

Diagnosis: Fracture of fourth lumbar vertebra-Inferior vertebral endplate of L4 and mild          

  compression fracture                                                                            

                                                                                                  

Presentation:                                                                                     

10/11                                                                                             

13:00 Chief complaint: EMS states: MECHANICAL FALL AT HOME THIS AM, NO LOC. Coronavirus       bp  

      screen: At this time, the client does not indicate any symptoms associated with             

      coronavirus-19. Ebola Screen: No symptoms or risks identified at this time. Initial         

      Sepsis Screen: Does the patient meet any 2 criteria? No. Patient's initial sepsis           

      screen is negative. Does the patient have a suspected source of infection? No.              

      Patient's initial sepsis screen is negative. Risk Assessment: Do you want to hurt           

      yourself or someone else? Patient reports no desire to harm self or others. Onset of        

      symptoms was 2021 at 12:00. Care prior to arrival: Placed on backboard.         

13:00 Method Of Arrival: EMS: [facility]                                                       bp  

13:00 Acuity: ANNE 3                                                                           bp  

                                                                                                  

Triage Assessment:                                                                                

13:00 General: Appears distressed, uncomfortable, obese, Behavior is cooperative, appropriate bp  

      for age, anxious. Pain: Complains of pain in left hip. EENT: No deficits noted. Neuro:      

      No deficits noted. Cardiovascular: No deficits noted. Respiratory: No deficits noted.       

      GI: No signs and/or symptoms were reported involving the gastrointestinal system. :       

      No signs and/or symptoms were reported regarding the genitourinary system. Derm: No         

      deficits noted. Musculoskeletal: Circulation, motion, and sensation intact. Range of        

      motion: intact in all extremities.                                                          

                                                                                                  

Historical:                                                                                       

- Allergies:                                                                                      

13:26 Codeine;                                                                                jl7 

13:26 Erythromycin;                                                                           jl7 

- PMHx:                                                                                           

13:26 Dementia; Diabetes - IDDM; Hypertension;                                                jl7 

                                                                                                  

- Immunization history:: Adult Immunizations unknown.                                             

- Social history:: Smoking status: Patient denies any tobacco usage or history of.                

                                                                                                  

                                                                                                  

Screenin:00 Abuse screen: Denies threats or abuse. Denies injuries from another. Nutritional        bp  

      screening: No deficits noted. Tuberculosis screening: No symptoms or risk factors           

      identified. Fall Risk None identified.                                                      

                                                                                                  

Assessment:                                                                                       

13:00 General: SEE TRIAGE NOTE.                                                               bp  

14:00 Reassessment: Patient appears in no apparent distress at this time. No changes from     jl7 

      previously documented assessment. Patient and/or family updated on plan of care and         

      expected duration. Pain level reassessed. Patient is alert, oriented x 3, equal             

      unlabored respirations, skin warm/dry/pink.                                                 

15:15 Reassessment: CRUZITO Malave at bedside discussing results and POC.                        jl7 

                                                                                                  

Vital Signs:                                                                                      

13:00  / 108; Pulse 66; Resp 18; Temp 98.3; Pulse Ox 98% ;                              bp  

15:04  / 107; Pulse 86; Resp 15; Pulse Ox 96% ;                                         jl7 

15:30  / 101; Pulse 86; Resp 15; Pulse Ox 98% ;                                         jl7 

16:00  / 98; Pulse 84; Resp 13; Pulse Ox 98% ;                                          jl7 

                                                                                                  

ED Course:                                                                                        

13:00 Patient arrived in ED.                                                                  ds1 

13:00 Arm band placed on.                                                                     bp  

13:00 Patient has correct armband on for positive identification. Bed in low position. Call   bp  

      light in reach. Side rails up X2.                                                           

13:00 Cardiac monitor on. Pulse ox on. NIBP on.                                               jl7 

13:00 Maintain EMS IV. Dressing intact. Good blood return noted. Site clean \T\ dry. Gauge \T\    bp

      site: 20 GAUGE LEFT AC.                                                                     

13:06 Triage completed.                                                                       bp  

13:06 Ananda Torres NP is PHCP.                                                           pm1 

13:06 Tee Castillo MD is Attending Physician.                                              pm1 

13:24 Marvin Hough RN is Primary Nurse.                                                      jl7 

13:27 Cleaned of incontinence. Linen changed.                                                 jl7 

13:30 Initial lab(s) drawn, by ED staff, sent to lab.                                         jl7 

13:37 CT Head C Spine In Process Unspecified.                                                 EDMS

13:37 CT Pelvis wo Cont In Process Unspecified.                                               EDMS

13:37 CT Lumbar Spine Wo Con In Process Unspecified.                                          EDMS

14:18 Femur Right XRAY In Process Unspecified.                                                EDMS

16:32 No provider procedures requiring assistance completed. IV discontinued, intact,         jl7 

      bleeding controlled, No redness/swelling at site. Pressure dressing applied.                

                                                                                                  

Administered Medications:                                                                         

13:25 Drug: morphine 4 mg Route: IVP; Site: left antecubital;                                 bp  

13:55 Follow up: Response: No adverse reaction; Pain is decreased                             jl7 

13:25 Drug: Zofran (Ondansetron) 4 mg Route: IVP; Site: left antecubital;                     bp  

15:49 Follow up: Response: No adverse reaction                                                jl7 

15:44 Drug: Ketorolac 15 mg Route: IVP; Site: left antecubital;                               jl7 

16:00 Follow up: Response: No adverse reaction; Pain is decreased                             jl7 

15:46 Drug: Zofran (Ondansetron) 4 mg Route: IVP; Site: left antecubital;                     jl7 

16:33 Follow up: Response: No adverse reaction                                                jl7 

15:48 Drug: morphine 4 mg Route: IVP; Site: left antecubital;                                 jl7 

16:00 Follow up: Response: No adverse reaction; Pain is decreased                             jl7 

                                                                                                  

                                                                                                  

Outcome:                                                                                          

16:13 Discharge ordered by MD.                                                                pm1 

16:32 Discharged to home ambulatory.                                                          jl7 

16:32 Condition: stable                                                                           

16:32 Discharge instructions given to patient, Instructed on discharge instructions, follow       

      up and referral plans. medication usage, Demonstrated understanding of instructions,        

      follow-up care, medications, Prescriptions given X 2.                                       

16:33 Patient left the ED.                                                                    jl7 

                                                                                                  

Signatures:                                                                                       

Dispatcher [employer]                           EDMS                                                 

Zara Downey                                ds1                                                  

Ananda Torres NP                    NP   pm1                                                  

Marvin Hough RN                        RN   jl7                                                  

Bipin Rosas RN                      RN   bp                                                   

                                                                                                  

**************************************************************************************************

## 2021-10-11 NOTE — RAD REPORT
EXAM DESCRIPTION:  RAD - Femur Right - 10/11/2021 2:19 pm

 

CLINICAL HISTORY:  Right hip pain, fall

 

COMPARISON:  No comparisons

 

FINDINGS:  Multiple AP and cross-table lateral views of the right femur were obtained. Cross-table vi
ews are limited in detail.

 

No fracture, dislocation or pathologic bone process identifiable. Hip joint degenerative changes are 
present. Multiple crossing skin fold artifacts are present. An acute or worrisome bone finding is not
 identifiable. Patient has moderate severity degenerative change at the knee joint. No joint effusion
 at the knee identified.

 

No air or foreign body in the soft tissues.

 

IMPRESSION:  Hip joint and knee joint degenerative changes are present without acute right femur find
ing identified.

## 2021-10-11 NOTE — RAD REPORT
EXAM DESCRIPTION:  CT - Head C Spine Mpr Wo Con - 10/11/2021 1:37 pm

 

CLINICAL HISTORY:  Head and neck injury status post fall. Head and neck pain

 

COMPARISON:  None.

 

TECHNIQUE:  Computed axial tomography of the head and cervical spine was obtained.

 

Sagittal and coronal reconstruction was performed.

 

All CT scans are performed using dose optimization technique as appropriate and may include automated
 exposure control or mA/KV adjustment according to patient size.

 

FINDINGS:  An intracranial bleed is not seen.

 

Mild to moderate low-density areas within periventricular deep and subcortical white matter probably 
ischemic changes secondary to small vessel disease.

 

10 millimeter calcification right scalp. This is nonspecific but probably benign

 

The ventricles are normal in caliber. An extra-axial fluid collection is not noted.Fluid within the v
isualized sinuses and mastoids is not seen

 

A cervical fracture is not visualized. No dislocation is noted. Spondylosis involves the cervical spi
ne. Slight anterior subluxation C3 on C4 and C4 on C5.

 

IMPRESSION:  No acute intracranial abnormality is seen.

 

A cervical fracture is not visualized.  If the patient continues to have symptoms to suggest intracra
nial /spinal cord/ligamentous pathology then MRI would be recommended

## 2021-10-11 NOTE — RAD REPORT
EXAM DESCRIPTION:  CT - Pelvis Wo Cont - 10/11/2021 1:37 pm

 

CLINICAL HISTORY:    Pelvic pain status post fall

 

COMPARISON:  None.

 

TECHNIQUE:  Computed axial tomography of the pelvis was obtained. Coronal and sagittal reconstruction
 performed

 

All CT scans are performed using dose optimization technique as appropriate and may include automated
 exposure control or mA/KV adjustment according to patient size.

 

FINDINGS:  No fracture or dislocation is seen.

 

Mild osteoarthritis involves the hips

 

Muscles are normal size and density.

 

A subcutaneous contusion is not noted

 

 

IMPRESSION:  No fracture seen

## 2021-10-11 NOTE — RAD REPORT
EXAM DESCRIPTION:  CTSpine Lumbar Wo Con10/11/2021 1:37 pm

 

CLINICAL HISTORY:  Back injury with back pain status post fall

 

COMPARISON:  None

 

TECHNIQUE:  Computed axial tomography lumbar spine was obtained with coronal and sagittal reconstruct
ion.

 

All CT scans are performed using dose optimization technique as appropriate and may include automated
 exposure control or mA/KV adjustment according to patient size.

 

FINDINGS:  An acute fracture involves the inferior vertebral endplate of L4. Minimal compression of t
he vertebral body. No retropulsion of bone into the spinal canal.

 

An old moderate compression fracture involves the L3 vertebral body. Retropulsion of bone results in 
mild narrowing of the spinal canal.

 

Sclerotic foci are present within the L2 vertebral body and S1 segment of the sacrum.

 

No dislocation. Slight anterior subluxation L4 on L5

 

Right posterior-lateral disc herniation suspected L5-S1

 

IMPRESSION:  Acute fracture involves the inferior vertebral endplate of L4. Minimal compression of th
e vertebral body. No retropulsion of bone into the spinal canal

 

Several bony sclerotic foci are nonspecific. These could represent bone islands or metastases. A bone
 scan could be obtained for further evaluation
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
